# Patient Record
Sex: FEMALE | Race: WHITE | NOT HISPANIC OR LATINO
[De-identification: names, ages, dates, MRNs, and addresses within clinical notes are randomized per-mention and may not be internally consistent; named-entity substitution may affect disease eponyms.]

---

## 2017-04-01 ENCOUNTER — TRANSCRIPTION ENCOUNTER (OUTPATIENT)
Age: 22
End: 2017-04-01

## 2022-06-17 ENCOUNTER — APPOINTMENT (OUTPATIENT)
Dept: OBGYN | Facility: CLINIC | Age: 27
End: 2022-06-17
Payer: COMMERCIAL

## 2022-06-17 ENCOUNTER — TRANSCRIPTION ENCOUNTER (OUTPATIENT)
Age: 27
End: 2022-06-17

## 2022-06-17 VITALS
HEIGHT: 62 IN | TEMPERATURE: 98 F | WEIGHT: 125 LBS | BODY MASS INDEX: 23 KG/M2 | DIASTOLIC BLOOD PRESSURE: 74 MMHG | RESPIRATION RATE: 16 BRPM | OXYGEN SATURATION: 98 % | SYSTOLIC BLOOD PRESSURE: 114 MMHG | HEART RATE: 95 BPM

## 2022-06-17 PROBLEM — Z00.00 ENCOUNTER FOR PREVENTIVE HEALTH EXAMINATION: Status: ACTIVE | Noted: 2022-06-17

## 2022-06-17 PROCEDURE — 99395 PREV VISIT EST AGE 18-39: CPT

## 2022-06-17 NOTE — HISTORY OF PRESENT ILLNESS
[FreeTextEntry1] : TYLER GARCIA 26 YO, presents for Annual\par  G 0\par LMP: 06/01/2022 Regular Menses\par Medication Junel FE. \par Sexually active with same partner. \par No complaints. \par Non smoker. \par No health issues. \par \par

## 2022-06-17 NOTE — REVIEW OF SYSTEMS
[Negative] : Heme/Lymph Histology Selection Override (Optional- Will Default To Parent Diagnosis If N/A): Infiltrative Basal Cell Carcinoma

## 2022-06-19 LAB
C TRACH RRNA SPEC QL NAA+PROBE: NOT DETECTED
N GONORRHOEA RRNA SPEC QL NAA+PROBE: NOT DETECTED
SOURCE TP AMPLIFICATION: NORMAL

## 2022-06-23 LAB — CYTOLOGY CVX/VAG DOC THIN PREP: NORMAL

## 2022-09-02 ENCOUNTER — APPOINTMENT (OUTPATIENT)
Dept: OBGYN | Facility: CLINIC | Age: 27
End: 2022-09-02

## 2022-09-02 VITALS
TEMPERATURE: 98 F | OXYGEN SATURATION: 99 % | DIASTOLIC BLOOD PRESSURE: 76 MMHG | BODY MASS INDEX: 23.19 KG/M2 | SYSTOLIC BLOOD PRESSURE: 112 MMHG | WEIGHT: 126 LBS | HEIGHT: 62 IN | HEART RATE: 82 BPM | RESPIRATION RATE: 16 BRPM

## 2022-09-02 DIAGNOSIS — B37.3 CANDIDIASIS OF VULVA AND VAGINA: ICD-10-CM

## 2022-09-02 PROCEDURE — 99213 OFFICE O/P EST LOW 20 MIN: CPT

## 2022-09-02 NOTE — HISTORY OF PRESENT ILLNESS
[FreeTextEntry1] : TYLER GARCIA 28 YO, presents for Vaginal itching\par G 0  \par LMP: 08/10/2022 - Regular  Menses\par Sexually active with same partner. \par Medication: Junel Fe. \par C/O vulva itching on & off. \par The itching is less today. \par No change in lifestyle. \par Discussion after PE. \par Vulva seems normal. \par Perineal hygiene discussed.

## 2022-12-28 ENCOUNTER — RX RENEWAL (OUTPATIENT)
Age: 27
End: 2022-12-28

## 2022-12-28 RX ORDER — CLOTRIMAZOLE AND BETAMETHASONE DIPROPIONATE 10; .5 MG/G; MG/G
1-0.05 CREAM TOPICAL TWICE DAILY
Qty: 15 | Refills: 1 | Status: ACTIVE | COMMUNITY
Start: 2022-09-02 | End: 1900-01-01

## 2023-01-24 ENCOUNTER — APPOINTMENT (OUTPATIENT)
Dept: OBGYN | Facility: CLINIC | Age: 28
End: 2023-01-24

## 2023-02-11 ENCOUNTER — APPOINTMENT (OUTPATIENT)
Dept: OBGYN | Facility: CLINIC | Age: 28
End: 2023-02-11
Payer: COMMERCIAL

## 2023-02-11 VITALS
OXYGEN SATURATION: 97 % | TEMPERATURE: 98.2 F | DIASTOLIC BLOOD PRESSURE: 78 MMHG | SYSTOLIC BLOOD PRESSURE: 114 MMHG | HEIGHT: 62 IN | WEIGHT: 135 LBS | BODY MASS INDEX: 24.84 KG/M2 | RESPIRATION RATE: 16 BRPM | HEART RATE: 89 BPM

## 2023-02-11 DIAGNOSIS — Z30.019 ENCOUNTER FOR INITIAL PRESCRIPTION OF CONTRACEPTIVES, UNSPECIFIED: ICD-10-CM

## 2023-02-11 PROCEDURE — 99213 OFFICE O/P EST LOW 20 MIN: CPT

## 2023-02-11 RX ORDER — DROSPIRENONE 4 MG/1
4 TABLET, FILM COATED ORAL DAILY
Qty: 1 | Refills: 6 | Status: ACTIVE | COMMUNITY
Start: 2023-02-11 | End: 1900-01-01

## 2023-02-11 NOTE — HISTORY OF PRESENT ILLNESS
[FreeTextEntry1] : TYLER GARCIA 26 YO, presents for OCP Refill\par G 0  \par LMP: 01/25/2023 - Regular  Menses\par Sexually active, using June Pills\par Medication: Junel Pills\par No family history of breast cancer\par Want's to switch to POP due to Migraines. \par Has headaches during menses.\par To consider stopping OCP's for now. \par On no other medications.

## 2023-03-22 ENCOUNTER — RX RENEWAL (OUTPATIENT)
Age: 28
End: 2023-03-22

## 2023-03-22 RX ORDER — NORETHINDRONE ACETATE AND ETHINYL ESTRADIOL 1; 20 MG/1; UG/1
1-20 TABLET ORAL DAILY
Qty: 21 | Refills: 11 | Status: ACTIVE | COMMUNITY
Start: 2022-06-17 | End: 1900-01-01

## 2023-08-12 ENCOUNTER — APPOINTMENT (OUTPATIENT)
Dept: OBGYN | Facility: CLINIC | Age: 28
End: 2023-08-12

## 2023-09-14 ENCOUNTER — APPOINTMENT (OUTPATIENT)
Dept: OBGYN | Facility: CLINIC | Age: 28
End: 2023-09-14
Payer: COMMERCIAL

## 2023-09-14 VITALS
HEIGHT: 62 IN | BODY MASS INDEX: 25.4 KG/M2 | TEMPERATURE: 98.2 F | RESPIRATION RATE: 16 BRPM | HEART RATE: 85 BPM | DIASTOLIC BLOOD PRESSURE: 80 MMHG | WEIGHT: 138 LBS | SYSTOLIC BLOOD PRESSURE: 114 MMHG | OXYGEN SATURATION: 100 %

## 2023-09-14 DIAGNOSIS — Z01.419 ENCOUNTER FOR GYNECOLOGICAL EXAMINATION (GENERAL) (ROUTINE) W/OUT ABNORMAL FINDINGS: ICD-10-CM

## 2023-09-14 PROCEDURE — 99395 PREV VISIT EST AGE 18-39: CPT

## 2023-09-14 RX ORDER — DROSPIRENONE 4 MG/1
4 TABLET, FILM COATED ORAL
Qty: 1 | Refills: 11 | Status: ACTIVE | COMMUNITY
Start: 2023-09-14 | End: 1900-01-01

## 2023-09-20 LAB — CYTOLOGY CVX/VAG DOC THIN PREP: NORMAL

## 2024-08-21 ENCOUNTER — APPOINTMENT (OUTPATIENT)
Dept: OBGYN | Facility: CLINIC | Age: 29
End: 2024-08-21
Payer: COMMERCIAL

## 2024-08-21 VITALS
SYSTOLIC BLOOD PRESSURE: 116 MMHG | HEART RATE: 78 BPM | OXYGEN SATURATION: 98 % | DIASTOLIC BLOOD PRESSURE: 78 MMHG | HEIGHT: 62 IN | RESPIRATION RATE: 16 BRPM | BODY MASS INDEX: 24.84 KG/M2 | WEIGHT: 135 LBS

## 2024-08-21 DIAGNOSIS — Z30.019 ENCOUNTER FOR INITIAL PRESCRIPTION OF CONTRACEPTIVES, UNSPECIFIED: ICD-10-CM

## 2024-08-21 PROCEDURE — 99213 OFFICE O/P EST LOW 20 MIN: CPT

## 2024-08-21 RX ORDER — DROSPIRENONE 4 MG/1
4 TABLET, FILM COATED ORAL DAILY
Qty: 1 | Refills: 11 | Status: ACTIVE | COMMUNITY
Start: 2024-08-21 | End: 1900-01-01

## 2024-08-21 NOTE — HISTORY OF PRESENT ILLNESS
[FreeTextEntry1] : TYLER JOSE 29 year, presents for OCP Refills G 0    LMP: Unsure - on birth control pills Sexually active with same partner.  Medication: Slynd  4mg.  Nonsmoker, NKDA.  No complaints.

## 2025-07-18 ENCOUNTER — NON-APPOINTMENT (OUTPATIENT)
Age: 30
End: 2025-07-18

## 2025-07-18 ENCOUNTER — APPOINTMENT (OUTPATIENT)
Dept: OBGYN | Facility: CLINIC | Age: 30
End: 2025-07-18

## 2025-07-18 VITALS
BODY MASS INDEX: 24.29 KG/M2 | WEIGHT: 132 LBS | DIASTOLIC BLOOD PRESSURE: 72 MMHG | HEIGHT: 62 IN | TEMPERATURE: 98 F | HEART RATE: 99 BPM | SYSTOLIC BLOOD PRESSURE: 110 MMHG | OXYGEN SATURATION: 100 % | RESPIRATION RATE: 16 BRPM

## 2025-07-18 PROCEDURE — 99395 PREV VISIT EST AGE 18-39: CPT

## 2025-07-18 RX ORDER — DROSPIRENONE 4 MG/1
4 TABLET, FILM COATED ORAL
Qty: 1 | Refills: 11 | Status: ACTIVE | COMMUNITY
Start: 2025-07-18 | End: 1900-01-01

## 2025-07-23 LAB — CYTOLOGY CVX/VAG DOC THIN PREP: NORMAL
